# Patient Record
Sex: MALE | Race: WHITE | NOT HISPANIC OR LATINO
[De-identification: names, ages, dates, MRNs, and addresses within clinical notes are randomized per-mention and may not be internally consistent; named-entity substitution may affect disease eponyms.]

---

## 2017-01-20 ENCOUNTER — APPOINTMENT (OUTPATIENT)
Dept: COLORECTAL SURGERY | Facility: CLINIC | Age: 57
End: 2017-01-20

## 2017-01-26 ENCOUNTER — OUTPATIENT (OUTPATIENT)
Dept: OUTPATIENT SERVICES | Facility: HOSPITAL | Age: 57
LOS: 1 days | End: 2017-01-26
Payer: COMMERCIAL

## 2017-01-26 DIAGNOSIS — K60.3 ANAL FISTULA: ICD-10-CM

## 2017-01-26 DIAGNOSIS — Z01.818 ENCOUNTER FOR OTHER PREPROCEDURAL EXAMINATION: ICD-10-CM

## 2017-01-26 LAB
HCT VFR BLD CALC: 44.8 % — SIGNIFICANT CHANGE UP (ref 39–50)
HGB BLD-MCNC: 15.2 G/DL — SIGNIFICANT CHANGE UP (ref 13–17)
MCHC RBC-ENTMCNC: 31.7 PG — SIGNIFICANT CHANGE UP (ref 27–34)
MCHC RBC-ENTMCNC: 33.9 GM/DL — SIGNIFICANT CHANGE UP (ref 32–36)
MCV RBC AUTO: 93.5 FL — SIGNIFICANT CHANGE UP (ref 80–100)
PLATELET # BLD AUTO: 304 K/UL — SIGNIFICANT CHANGE UP (ref 150–400)
RBC # BLD: 4.79 M/UL — SIGNIFICANT CHANGE UP (ref 4.2–5.8)
RBC # FLD: 11.7 % — SIGNIFICANT CHANGE UP (ref 10.3–14.5)
WBC # BLD: 7.58 K/UL — SIGNIFICANT CHANGE UP (ref 3.8–10.5)
WBC # FLD AUTO: 7.58 K/UL — SIGNIFICANT CHANGE UP (ref 3.8–10.5)

## 2017-01-26 PROCEDURE — 85027 COMPLETE CBC AUTOMATED: CPT

## 2017-01-26 PROCEDURE — 36415 COLL VENOUS BLD VENIPUNCTURE: CPT

## 2017-01-26 PROCEDURE — G0463: CPT

## 2017-02-02 ENCOUNTER — RESULT REVIEW (OUTPATIENT)
Age: 57
End: 2017-02-02

## 2017-02-03 ENCOUNTER — OUTPATIENT (OUTPATIENT)
Dept: OUTPATIENT SERVICES | Facility: HOSPITAL | Age: 57
LOS: 1 days | End: 2017-02-03
Payer: COMMERCIAL

## 2017-02-03 ENCOUNTER — APPOINTMENT (OUTPATIENT)
Dept: COLORECTAL SURGERY | Facility: HOSPITAL | Age: 57
End: 2017-02-03

## 2017-02-03 DIAGNOSIS — K60.3 ANAL FISTULA: ICD-10-CM

## 2017-02-03 PROCEDURE — 88304 TISSUE EXAM BY PATHOLOGIST: CPT | Mod: 26

## 2017-02-03 PROCEDURE — 88304 TISSUE EXAM BY PATHOLOGIST: CPT

## 2017-02-03 PROCEDURE — 46050 I&D PERIANAL ABSCESS SUPFC: CPT

## 2017-02-03 PROCEDURE — 46030 REMOVAL ANAL SETON OTH MRK: CPT

## 2017-02-03 PROCEDURE — C1729: CPT

## 2017-02-03 PROCEDURE — C1889: CPT

## 2017-02-15 ENCOUNTER — APPOINTMENT (OUTPATIENT)
Dept: COLORECTAL SURGERY | Facility: CLINIC | Age: 57
End: 2017-02-15

## 2017-02-15 ENCOUNTER — APPOINTMENT (OUTPATIENT)
Dept: CT IMAGING | Facility: CLINIC | Age: 57
End: 2017-02-15

## 2017-02-15 VITALS — TEMPERATURE: 97.7 F

## 2017-02-23 ENCOUNTER — TRANSCRIPTION ENCOUNTER (OUTPATIENT)
Age: 57
End: 2017-02-23

## 2017-03-01 ENCOUNTER — APPOINTMENT (OUTPATIENT)
Dept: COLORECTAL SURGERY | Facility: CLINIC | Age: 57
End: 2017-03-01

## 2017-03-01 DIAGNOSIS — K61.1 RECTAL ABSCESS: ICD-10-CM

## 2017-03-29 ENCOUNTER — APPOINTMENT (OUTPATIENT)
Dept: COLORECTAL SURGERY | Facility: CLINIC | Age: 57
End: 2017-03-29

## 2017-03-29 DIAGNOSIS — K60.3 ANAL FISTULA: ICD-10-CM

## 2018-01-30 ENCOUNTER — APPOINTMENT (OUTPATIENT)
Dept: GASTROENTEROLOGY | Facility: CLINIC | Age: 58
End: 2018-01-30
Payer: COMMERCIAL

## 2018-01-30 VITALS
HEIGHT: 70 IN | SYSTOLIC BLOOD PRESSURE: 140 MMHG | DIASTOLIC BLOOD PRESSURE: 80 MMHG | WEIGHT: 195 LBS | BODY MASS INDEX: 27.92 KG/M2 | OXYGEN SATURATION: 96 % | HEART RATE: 71 BPM | TEMPERATURE: 98.9 F

## 2018-01-30 DIAGNOSIS — K52.9 NONINFECTIVE GASTROENTERITIS AND COLITIS, UNSPECIFIED: ICD-10-CM

## 2018-01-30 DIAGNOSIS — Z78.9 OTHER SPECIFIED HEALTH STATUS: ICD-10-CM

## 2018-01-30 PROCEDURE — 99213 OFFICE O/P EST LOW 20 MIN: CPT

## 2018-01-30 RX ORDER — UBIDECARENONE/VIT E ACET 100MG-5
CAPSULE ORAL
Refills: 0 | Status: ACTIVE | COMMUNITY

## 2018-01-30 RX ORDER — FELODIPINE 10 MG/1
10 TABLET, EXTENDED RELEASE ORAL
Refills: 0 | Status: ACTIVE | COMMUNITY

## 2018-03-06 ENCOUNTER — APPOINTMENT (OUTPATIENT)
Dept: GASTROENTEROLOGY | Facility: AMBULATORY MEDICAL SERVICES | Age: 58
End: 2018-03-06
Payer: COMMERCIAL

## 2018-03-06 PROCEDURE — 45380 COLONOSCOPY AND BIOPSY: CPT

## 2019-02-04 ENCOUNTER — OTHER (OUTPATIENT)
Age: 59
End: 2019-02-04

## 2021-09-14 ENCOUNTER — APPOINTMENT (OUTPATIENT)
Dept: GASTROENTEROLOGY | Facility: CLINIC | Age: 61
End: 2021-09-14
Payer: COMMERCIAL

## 2021-09-14 VITALS
TEMPERATURE: 97.7 F | BODY MASS INDEX: 24.91 KG/M2 | DIASTOLIC BLOOD PRESSURE: 88 MMHG | SYSTOLIC BLOOD PRESSURE: 140 MMHG | OXYGEN SATURATION: 98 % | WEIGHT: 174 LBS | HEART RATE: 67 BPM | HEIGHT: 70 IN

## 2021-09-14 DIAGNOSIS — K64.8 OTHER HEMORRHOIDS: ICD-10-CM

## 2021-09-14 DIAGNOSIS — Z86.010 PERSONAL HISTORY OF COLONIC POLYPS: ICD-10-CM

## 2021-09-14 DIAGNOSIS — K57.30 DIVERTICULOSIS OF LARGE INTESTINE W/OUT PERFORATION OR ABSCESS W/OUT BLEEDING: ICD-10-CM

## 2021-09-14 DIAGNOSIS — Z87.19 PERSONAL HISTORY OF OTHER DISEASES OF THE DIGESTIVE SYSTEM: ICD-10-CM

## 2021-09-14 PROCEDURE — 99203 OFFICE O/P NEW LOW 30 MIN: CPT

## 2021-09-14 RX ORDER — PSYLLIUM HUSK (WITH SUGAR) 3 G/12 G
POWDER (GRAM) ORAL
Refills: 0 | Status: ACTIVE | COMMUNITY

## 2021-09-14 RX ORDER — CHOLECALCIFEROL (VITAMIN D3) 25 MCG
TABLET ORAL
Refills: 0 | Status: ACTIVE | COMMUNITY

## 2021-09-14 RX ORDER — AMOXICILLIN 250 MG
CAPSULE ORAL
Refills: 0 | Status: ACTIVE | COMMUNITY

## 2021-09-14 RX ORDER — SODIUM SULFATE, POTASSIUM SULFATE, MAGNESIUM SULFATE 17.5; 3.13; 1.6 G/ML; G/ML; G/ML
17.5-3.13-1.6 SOLUTION, CONCENTRATE ORAL
Qty: 1 | Refills: 0 | Status: DISCONTINUED | COMMUNITY
Start: 2018-01-30 | End: 2021-09-14

## 2021-09-15 NOTE — CONSULT LETTER
[FreeTextEntry1] : Dear Dr. Vale Romero,\Banner \Banner I had the pleasure of seeing your patient ANDREW PONCE in the office today.  My office note is attached. PLEASE READ THE "ASSESSMENT" SECTION OF THE NOTE TO SEE MY IMPRESSION AND PLAN.\par \Banner Thank you very much for allowing me to participate in the care of your patient.\Banner \Banner Sincerely,\Banner \Banner Malik Shankar M.D., FAC, [facility]anner Director, Celiac Program at [facility]anner  of Medicine\Formerly [facility] and [facility] [employer] [facility] at [facility] Practice Director,\[facility] - Gastroenterology/Internal Medicine at [address] - [address]\[place], [place] [address] Tel: [phone]\Banner Email: [email or web address] \Banner The attached note has been created using a voice recognition system (Dragon).  There may be some misspellings and typos.  Please call my office if you have any issues or questions.

## 2021-09-15 NOTE — ASSESSMENT
[FreeTextEntry1] : Patient with a history of adenomatous colonic polyps.  He has known diverticulosis.\par \par A colonoscopy has been scheduled. The risks, benefits, alternatives, and limitations of the procedure, including the possibility of missed lesions, were explained.\par \par Information was given to the patient regarding diverticulosis and a high-fiber diet.\par \par \par Plan from 1/30/2018 - Patient with a history of colitis seen in the sigmoid and rectum along with history of adenomatous polyps and diverticulosis. He has had some recent chest pain.\par \par Patient was advised to undergo cardiac evaluation first. Assuming the evaluation is negative, A colonoscopy has been scheduled. The risks, benefits, alternatives, and limitations of the procedure, including the possibility of missed lesions, were explained.

## 2021-09-15 NOTE — HISTORY OF PRESENT ILLNESS
[FreeTextEntry1] : The patient is a 61-year-old man with a history of adenomatous colonic polyps.  His last colonoscopy was on March 6, 2018.  We reviewed the results of that examination which revealed sigmoid diverticulosis with localized erythema but negative biopsies.  At that time, a hyperplastic rectal polyp was removed.  The patient also has internal hemorrhoids which are asymptomatic.  The patient currently feels well denying abdominal pain, heartburn, dysphagia.  He reports 2 solid bowel movements a day without melena or bright red blood per rectum.  The patient has lost 20 pounds over the past year intentionally.  The patient has not been admitted to the hospital in the past year and denies any cardiac issues.\par \par \par Note from 1/30/2018 - On a previous colonoscopy, the patient had colitis in the sigmoid and rectum along with adenomatous polyps and diverticulosis. It was unclear whether or not the colitis was chronic in nature. The patient denies abdominal pain, diarrhea, or constipation. He has one solid bowel movement a day without melena or prior blood per rectum. He notes rare heartburn when he eats late. He denies dysphagia. His weight is stable. The patient has had recent chest pain and is seeing a cardiologist tomorrow. The patient has not been hospitalized in the past year and denies any cardiac issues.

## 2021-10-12 ENCOUNTER — APPOINTMENT (OUTPATIENT)
Dept: GASTROENTEROLOGY | Facility: AMBULATORY MEDICAL SERVICES | Age: 61
End: 2021-10-12
Payer: COMMERCIAL

## 2021-10-12 PROCEDURE — 45380 COLONOSCOPY AND BIOPSY: CPT | Mod: 59

## 2021-10-12 PROCEDURE — 45385 COLONOSCOPY W/LESION REMOVAL: CPT | Mod: 33

## 2021-12-02 ENCOUNTER — TRANSCRIPTION ENCOUNTER (OUTPATIENT)
Age: 61
End: 2021-12-02

## 2022-12-06 ENCOUNTER — TRANSCRIPTION ENCOUNTER (OUTPATIENT)
Age: 62
End: 2022-12-06

## 2023-02-07 ENCOUNTER — APPOINTMENT (OUTPATIENT)
Dept: ORTHOPEDIC SURGERY | Facility: CLINIC | Age: 63
End: 2023-02-07
Payer: COMMERCIAL

## 2023-02-07 VITALS — BODY MASS INDEX: 26.48 KG/M2 | HEIGHT: 70 IN | WEIGHT: 185 LBS

## 2023-02-07 PROCEDURE — 20610 DRAIN/INJ JOINT/BURSA W/O US: CPT | Mod: RT

## 2023-02-07 PROCEDURE — 99203 OFFICE O/P NEW LOW 30 MIN: CPT | Mod: 25

## 2023-02-07 PROCEDURE — 73030 X-RAY EXAM OF SHOULDER: CPT | Mod: RT

## 2023-02-07 RX ORDER — OLMESARTAN MEDOXOMIL 20 MG/1
20 TABLET, FILM COATED ORAL
Refills: 0 | Status: ACTIVE | COMMUNITY

## 2023-02-07 RX ORDER — ATORVASTATIN CALCIUM 20 MG/1
20 TABLET, FILM COATED ORAL
Refills: 0 | Status: ACTIVE | COMMUNITY

## 2023-02-07 RX ORDER — SODIUM SULFATE, POTASSIUM SULFATE, MAGNESIUM SULFATE 17.5; 3.13; 1.6 G/ML; G/ML; G/ML
17.5-3.13-1.6 SOLUTION, CONCENTRATE ORAL
Qty: 1 | Refills: 0 | Status: COMPLETED | COMMUNITY
Start: 2021-09-14 | End: 2023-02-07

## 2023-02-08 ENCOUNTER — RESULT CHARGE (OUTPATIENT)
Age: 63
End: 2023-02-08

## 2023-02-08 NOTE — IMAGING
[de-identified] : (Shoulder Examination)\par \par Laterality\par -Right\par \par General\par -In no acute distress: yes\par -Alert and oriented to person, place and time: yes\par -Lymphadenopathy: no\par -Peripheral edema: no\par -Sensation grossly intact to light touch: yes\par -Capillary refill less than 2 seconds: yes \par \par Inspection\par -Skin intact: yes\par -Swelling: no\par -Atrophy: no\par -Scapular winging: no\par -AC deformity: no\par -Biceps deformity: no\par \par Tenderness to Palpation\par -Bicipital groove: no \par -AC joint: no\par -Scapulothoracic: no\par -Cervical paraspinal: no\par \par Range of Motion\par -Active forward elevation: 175\par -Passive forward elevation: 175\par -External rotation at the side: 80\par -Internal rotation behind the back: T7 \par -Cervical spine: normal\par \par Strength \par -Forward elevation: 5\par -External rotation at the side: 5\par -Internal rotation at the side: 5\par -Deltoid: 5\par \par Special Tests\par -Stevenson: positive \par -O’Renato’s: negative\par -Speed’s: negative\par -Cross body adduction: negative\par -Apprehension and relocation: negative\par -Sulcus sign: negative\par -Belly press: negative\par -Spurling’s: negative\par \par

## 2023-02-08 NOTE — DISCUSSION/SUMMARY
[de-identified] : (Assessment)\par \par History, clinical examination and imaging were most consistent with:\par -right rotator cuff tendonitis\par \par The diagnosis was explained in detail. The potential non-surgical and surgical treatments were reviewed. The relative risks and benefits of each option were considered relative to the patient’s age, activity level, medical history, symptom severity and previously attempted treatments. \par \par -Non-surgical treatment was selected. \par -X-ray is not working at the time of evaluation. Discussed the benefits of imaging to complete the evaluation. Patient defers imaging at this time at an outside location in favor of moving forward with treatment. He acknowledges the risk of injection in the setting of potentially unknown bony pathology, although this risk is low. He will obtain an XR at his next visit if failing to improve. \par -Patient would like to proceed with injection today for symptom relief as well as starting a home exercise program. Formal PT is discussed and deferred. \par -If patient fails to improve we would likely proceed with formal PT and an MRI to evaluate his rotator cuff. \par \par \par (Plan)\par \par -Activity modification\par -Injection: performed in the office today, details in procedure note. \par -Home exercise program: packet from [employer] provided\par -Follow up: 6 weeks if failing to improve\par \par \par (MDM) \par \par Problem Complexity\par -Moderate: chronic illness with exacerbation\par \par Risk\par -Moderate: injection\par \par Patient has not been seen by another provider in my practice within the past 2 years who specializes in orthopedic sports medicine, shoulder or elbow surgery. \par \par (Injection Procedure Note)\par \par Laterality \par -Right \par \par Location\par -Subacromial bursa\par \par Contents\par -40 mg kenalog \par -5 mL of 1% lidocaine\par \par Narrative\par -Discussed possible risks of corticosteroid injection including hyperglycemia, infection and skin discoloration. \par -Discussed that due to infection risk, an interval between injection and any future surgery is 6 weeks for arthroscopy and 3 months for arthroplasty.\par -Informed consent was obtained.\par -Injection performed using aseptic technique. Tolerated well with no complications. \par \par

## 2023-02-08 NOTE — HISTORY OF PRESENT ILLNESS
[de-identified] : (New Visit)\par \par Patient Details\par -Age: 63\par -Occupation: retired\par -Worker’s compensation claim: no\par -No-fault claim: no\par -School injury claim: no\par \par \par Symptom Details \par -Body part: RT shoulder \par -Duration of symptoms: a couple months\par -How symptoms began:  NKI, repeated overhead motions while spear fishing. pain while sleeping\par -Location of pain: Lateral\par -Quality of pain: dull\par -Pain score at rest: 3/10\par -Pain score with activity: 5/10\par -Swelling: no\par -Stiffness: no\par -Radicular symptoms (numbness or radiating pain down extremity): no\par \par \par Treatment Details\par -Activity modification: no\par -Medication: no\par -Physical therapy: no\par -Home exercise program: no\par -Injection: no\par -MRI: no\par \par

## 2023-03-19 ENCOUNTER — NON-APPOINTMENT (OUTPATIENT)
Age: 63
End: 2023-03-19

## 2023-03-24 ENCOUNTER — NON-APPOINTMENT (OUTPATIENT)
Age: 63
End: 2023-03-24

## 2023-03-24 ENCOUNTER — APPOINTMENT (OUTPATIENT)
Dept: OPHTHALMOLOGY | Facility: CLINIC | Age: 63
End: 2023-03-24
Payer: COMMERCIAL

## 2023-03-24 PROCEDURE — 92004 COMPRE OPH EXAM NEW PT 1/>: CPT

## 2023-03-28 ENCOUNTER — APPOINTMENT (OUTPATIENT)
Dept: ORTHOPEDIC SURGERY | Facility: CLINIC | Age: 63
End: 2023-03-28
Payer: COMMERCIAL

## 2023-03-28 DIAGNOSIS — M75.81 OTHER SHOULDER LESIONS, RIGHT SHOULDER: ICD-10-CM

## 2023-03-28 PROCEDURE — 99213 OFFICE O/P EST LOW 20 MIN: CPT

## 2023-03-28 NOTE — HISTORY OF PRESENT ILLNESS
[de-identified] : (History of Present Illness)\par \par Body part causing symptoms is the RT shoulder \par Pain score at rest is 2/10\par Pain score during activity is 2/10\par Treatments since last visit include CSI and home exercise program\par Compared to last visit, symptoms are improved \par He reports pain relief for 1 week from the injection, then symptoms recurred\par He changed his sleeping position, placing his arm at his side instead of over his head and symptoms are significantly improved

## 2023-03-28 NOTE — DISCUSSION/SUMMARY
[de-identified] : (Assessment and Plan)\par \par History, clinical examination and imaging were most consistent with:\par -right rotator cuff tendonitis\par \par The diagnosis was explained in detail. The potential non-surgical and surgical treatments were reviewed. The relative risks and benefits of each option were considered relative to the patient’s age, activity level, medical history, symptom severity and previously attempted treatments. \par \par -Non-surgical treatment was selected as he is improved from his previous visit. \par -Patient defers formal PT and will continue with a home exercise program. \par -Cortisone injection is discussed and deferred by the patient at this time. \par -Over the counter NSAID as needed for pain. GI precautions discussed.\par -The added clinical utility of an MRI was discussed. The patient deferred further diagnostic testing at this time.\par -Follow up in 6 to 8 weeks if symptoms persist or fail to improve. Consider repeat injection and PT versus MRI at that time. \par \par (MDM) \par \par Problem Complexity\par -Moderate: chronic illness with exacerbation\par \par Risk\par -Low: over the counter medication\par \par The patient was advised to consult with their primary medical provider prior to initiation of any new medications to reduce the risk of adverse effects specific to their long-term home medications and medical history. The risk of gastrointestinal irritation and kidney injury specific to long-term NSAID use was discussed.   \par \par  Complete abx on 9/27  DC nebulizers

## 2023-03-28 NOTE — IMAGING
[de-identified] : (Shoulder Examination)\par \par Laterality\par -Right\par \par General\par -In no acute distress: yes\par -Alert and oriented to person, place and time: yes\par -Lymphadenopathy: no\par -Peripheral edema: no\par -Sensation grossly intact to light touch: yes\par -Capillary refill less than 2 seconds: yes \par \par Inspection\par -Skin intact: yes\par -Swelling: no\par -Atrophy: no\par -Scapular winging: no\par -AC deformity: no\par -Biceps deformity: no\par \par Tenderness to Palpation\par -Bicipital groove: no \par -AC joint: no\par -Scapulothoracic: no\par -Cervical paraspinal: no\par \par Range of Motion\par -Active forward elevation: 175\par -Passive forward elevation: 175\par -External rotation at the side: 80\par -Internal rotation behind the back: T7 \par -Cervical spine: normal\par \par Strength \par -Forward elevation: 5\par -External rotation at the side: 5\par -Internal rotation at the side: 5\par -Deltoid: 5\par \par Special Tests\par -Stevenson: positive \par -O’Renato’s: negative\par -Speed’s: negative\par -Cross body adduction: negative\par -Apprehension and relocation: negative\par -Sulcus sign: negative\par -Belly press: negative\par -Spurling’s: negative\par \par

## 2023-05-02 ENCOUNTER — NON-APPOINTMENT (OUTPATIENT)
Age: 63
End: 2023-05-02

## 2023-05-02 ENCOUNTER — APPOINTMENT (OUTPATIENT)
Dept: OPHTHALMOLOGY | Facility: CLINIC | Age: 63
End: 2023-05-02
Payer: SELF-PAY

## 2023-05-02 PROCEDURE — 92015 DETERMINE REFRACTIVE STATE: CPT

## 2024-04-26 ENCOUNTER — APPOINTMENT (OUTPATIENT)
Dept: OPHTHALMOLOGY | Facility: CLINIC | Age: 64
End: 2024-04-26

## 2024-06-30 ENCOUNTER — NON-APPOINTMENT (OUTPATIENT)
Age: 64
End: 2024-06-30

## 2024-07-07 ENCOUNTER — NON-APPOINTMENT (OUTPATIENT)
Age: 64
End: 2024-07-07